# Patient Record
Sex: MALE | Race: WHITE | NOT HISPANIC OR LATINO | Employment: OTHER | URBAN - METROPOLITAN AREA
[De-identification: names, ages, dates, MRNs, and addresses within clinical notes are randomized per-mention and may not be internally consistent; named-entity substitution may affect disease eponyms.]

---

## 2017-08-03 ENCOUNTER — OFFICE VISIT (OUTPATIENT)
Dept: RETAIL CLINIC | Facility: CLINIC | Age: 32
End: 2017-08-03

## 2017-08-03 DIAGNOSIS — Z02.83 ENCOUNTER FOR DRUG SCREENING: Primary | ICD-10-CM

## 2020-06-26 ENCOUNTER — TELEMEDICINE (OUTPATIENT)
Dept: FAMILY MEDICINE CLINIC | Facility: CLINIC | Age: 35
End: 2020-06-26

## 2020-06-26 ENCOUNTER — TELEPHONE (OUTPATIENT)
Dept: FAMILY MEDICINE CLINIC | Facility: CLINIC | Age: 35
End: 2020-06-26

## 2020-06-26 DIAGNOSIS — R19.7 DIARRHEA, UNSPECIFIED TYPE: ICD-10-CM

## 2020-06-26 DIAGNOSIS — M79.10 MYALGIA: ICD-10-CM

## 2020-06-26 DIAGNOSIS — R07.89 INTERMITTENT LEFT-SIDED CHEST PAIN: ICD-10-CM

## 2020-06-26 DIAGNOSIS — R42 DIZZINESS: Primary | ICD-10-CM

## 2020-06-26 PROCEDURE — 99203 OFFICE O/P NEW LOW 30 MIN: CPT | Performed by: INTERNAL MEDICINE

## 2020-06-26 RX ORDER — MECLIZINE HYDROCHLORIDE 25 MG/1
25 TABLET ORAL 3 TIMES DAILY PRN
Qty: 30 TABLET | Refills: 1 | Status: SHIPPED | OUTPATIENT
Start: 2020-06-26 | End: 2020-06-26 | Stop reason: SDUPTHER

## 2020-06-26 RX ORDER — MECLIZINE HYDROCHLORIDE 25 MG/1
25 TABLET ORAL 3 TIMES DAILY PRN
Qty: 30 TABLET | Refills: 1 | Status: SHIPPED | OUTPATIENT
Start: 2020-06-26 | End: 2021-06-15

## 2020-06-26 NOTE — TELEPHONE ENCOUNTER
PATIENT CALLED STATING HAD A VV WITH DR VALLE THIS MORNING, AND WAS SUPPOSED TO SEND A PRESCRIPTION TO THE PHARMACY TO HELP WITH DIZZINESS.   PLEASE ADVISE    [phone]

## 2020-06-26 NOTE — PROGRESS NOTES
Subjective   Deyanira Mishel is a 35 y.o. male.  Patient former patient of ours but switch to insurance we do not accept.  3 days ago developed sudden onset of dizziness associated with diarrhea myalgias has not taken his temperature.  Never had ability to do so  There is no height or weight on file to calculate BMI.  History of Present Illness   You have chosen to receive care through a telehealth visit.  Do you consent to use a video/audio connection for your medical care today? Yes  Dizzy 3d ago 3 days ago more of a lightheaded sensation.  Seems to come and go with some records not aware of any particular head motions provoking this said diarrhea began about the same time no nausea vomiting no bladder reach goes through him no blood or mucus no the stools having charley horse/muscle cramps recurrently was not working and overly hot environment.  Is still urinating keeping fluids down patient is having some cough some occasional intermittent sharp short lasted sharp pain left side of chest.  Gets very transient overall does not feel good he has been to another facility has had a nasal swab done for COVID-19 I was 2 days ago he was told would have the results in 2-5 business days.  I will have him let us know his results he also said the swab did not go hyalinosis uncomfortable like it hurt others described so is not sure if that was deep enough to produce an accurate test has developed a drug allergy or antibiotic allergies since he is last here he does not know the name of it he was seen at [facility] for that we will have to try retrieve that information from him.  Overall healthy no chronic illnesses reported..  No underlying health disorders patient has no contributory family history does have an unknown antibiotic allergic reaction causing hives he was seen at [facility] for that wife try to retrieve that information no existing medicines patient's no reported smoking.  Review of Systems    HENT:        Dizziness see present illness   Respiratory:        Occasional l short-lived left-sided chest pain intermittent   Gastrointestinal: Positive for diarrhea.   Musculoskeletal: Positive for myalgias.   All other systems reviewed and are negative.      Objective   There were no vitals filed for this visit.      Physical Exam   Constitutional: He is oriented to person, place, and time. He appears well-developed and well-nourished.   Has not had ability check his temperature but does not feel hot.   HENT:   Head: Normocephalic and atraumatic.   Eyes: Conjunctivae are normal.   Pupils are equal and round.   Pulmonary/Chest: Effort normal.   No audible wheezing noted able to converse without trouble breathing.   Neurological: He is alert and oriented to person, place, and time.   Skin: Skin is warm and dry.       No results found for: INR    Procedures    Assessment/Plan   Total time of video visit 35 minutes  1.  Dizziness Antivert 25 1 every 8 hours as needed.    2.  Diarrhea try over-the-counter Imodium see if that helps    3 intermittent left-sided chest pain observation for now if worsens go to urgent care or go to ER.    4.  Myalgias patient's had a swab done for [facility] testing elsewhere.  This occurred 2 days ago he was told he did have results back in 2-5 business days.  We are going to self quarantine patient for 14 days time he also indicates the swab did not go very far and his nose was questioning how valid or accurate the test will be we will have him subsequently for 10 days time if symptoms progress we might have him go to the urgent care and I did mention one at [address] for testing.  If the diarrhea is sufficiently bad that he quits urinating or is having fairly intense chest pain or shortness of breath he is to go to the ER instead for further testing there with ability to get a quick turnaround COVID test done.    Work note self quarantine home 14 days time

## 2020-06-26 NOTE — PATIENT INSTRUCTIONS

## 2020-06-30 ENCOUNTER — TELEMEDICINE (OUTPATIENT)
Dept: FAMILY MEDICINE CLINIC | Facility: CLINIC | Age: 35
End: 2020-06-30

## 2020-06-30 DIAGNOSIS — F32.A DEPRESSION, UNSPECIFIED DEPRESSION TYPE: Primary | ICD-10-CM

## 2020-06-30 DIAGNOSIS — R19.7 DIARRHEA, UNSPECIFIED TYPE: ICD-10-CM

## 2020-06-30 DIAGNOSIS — R42 DIZZINESS: ICD-10-CM

## 2020-06-30 PROCEDURE — 99213 OFFICE O/P EST LOW 20 MIN: CPT | Performed by: INTERNAL MEDICINE

## 2020-06-30 RX ORDER — ESCITALOPRAM OXALATE 10 MG/1
TABLET ORAL
Qty: 30 TABLET | Refills: 0 | Status: SHIPPED | OUTPATIENT
Start: 2020-06-30 | End: 2021-06-15

## 2020-06-30 NOTE — PROGRESS NOTES
Subjective   Deyanira Florentino is a 35 y.o. male.  Patient getting back with me on recent illness with dizziness myalgias diarrhea did get tested for COVID-19 his test was negative I do not locate that but this is what he told me today  There is no height or weight on file to calculate BMI.  History of Present Illness You have chosen to receive care through a telehealth visit.  Do you consent to use a video/audio connection for your medical care today? Yes  Recent illness with dizziness fatigue myalgias some diarrhea did get tested for COVID-19 was tested negative he is under quarantine 14 days which will and July 10.  Still has dizziness intermittent still somewhat tired also intermittently diarrhea is been there if he eats too much she has diarrhea if he eats lightly he does not there is no blood or mucus in stool no reported temperature with him he does have an elevated pressure has been doing this for several weeks now no thoughts of hurting self hurting others but is feeling down and assertive self-contained type individual is not sure if others notices or not.  Several things going on including death of father is problematic to patient again denies thoughts of hurting self or hurting others.  But we will put him on a antidepressant he has not been on any before with plan follow-up in 1 month's time with the dizziness Antivert does help but is not resolved totally we will wait for the quarantine.  2 and on the 10th and then send him to ENT for evaluation  .  If diarrhea persist beyond 10 also bring in for stool specimens.  This was a video visit..  Out successfully with both audio and visual on both ends.  Review of Systems    Objective   There were no vitals filed for this visit.      Physical Exam   Constitutional: He appears well-developed and well-nourished.   HENT:   Head: Normocephalic and atraumatic.   Eyes: Conjunctivae are normal.   Pupils are equal   Pulmonary/Chest: Effort normal.   No visible or  audible respiratory distress able to speak in complete sentences   Skin:   No rash apparent skin appears within normal limits to visual       No results found for: INR    Procedures    Assessment/Plan Video visit duration 20 minutes    1.  Dizziness plan refer to ENT continue Antivert    2.  Depression mild plan Lexapro 10 mg half tablet daily for 1 week then whole tab daily covering her status in 1 month's time    3.  Diarrhea observation for now no voiced blood or mucus in the stool if still having diarrhea after July 10 which is the patient's end of his quarantine phase he is coming for stool cultures.    4.  Myalgias much improved nearly  Resolved plan observation     By his history did get seen and tested for COVID-19 he states his test was negative.    Much of this encounter note is an electronic transcription/translation of spoken language to printed text.  The electronic translation of spoken language may permit erroneous, or at times, nonsensical words or phrases to be inadvertently transcribed.  Although I have reviewed the note for such errors, some may still exist. If there are questions or for further clarification, please contact me.

## 2020-07-07 ENCOUNTER — DOCUMENTATION (OUTPATIENT)
Dept: FAMILY MEDICINE CLINIC | Facility: CLINIC | Age: 35
End: 2020-07-07

## 2022-01-18 ENCOUNTER — TRANSCRIBE ORDERS (OUTPATIENT)
Dept: OCCUPATIONAL THERAPY | Facility: CLINIC | Age: 37
End: 2022-01-18

## 2022-01-18 DIAGNOSIS — R29.898 LEFT HAND WEAKNESS: Primary | ICD-10-CM

## 2022-01-18 DIAGNOSIS — R20.2 LEFT HAND PARESTHESIA: ICD-10-CM

## 2022-01-20 ENCOUNTER — TREATMENT (OUTPATIENT)
Dept: PHYSICAL THERAPY | Facility: CLINIC | Age: 37
End: 2022-01-20

## 2022-01-20 DIAGNOSIS — R20.2 LEFT HAND PARESTHESIA: ICD-10-CM

## 2022-01-20 DIAGNOSIS — R29.898 LEFT HAND WEAKNESS: Primary | ICD-10-CM

## 2022-01-20 PROCEDURE — 97110 THERAPEUTIC EXERCISES: CPT | Performed by: PHYSICAL THERAPIST

## 2022-01-20 PROCEDURE — 97162 PT EVAL MOD COMPLEX 30 MIN: CPT | Performed by: PHYSICAL THERAPIST

## 2022-01-20 PROCEDURE — 97530 THERAPEUTIC ACTIVITIES: CPT | Performed by: PHYSICAL THERAPIST

## 2022-01-20 NOTE — PROGRESS NOTES
Physical Therapy Initial Evaluation and Plan of Care    Patient Name: Deyanira Florentino          :  1985  Referring Physician: Dalia Acosta APRN  Diagnosis: Left hand weakness [R29.898]    Date of Evaluation: 2022  ______________________________________________________________________    Subjective Evaluation    History of Present Illness  Date of onset: 2022 (Paper says 2022; Pt says 2022)  Mechanism of injury: Lacerationi LIF -Cutting a quarter round - using a mechanical lathe / scissors-type to cut wood -    Noting numbness in fingers 4-5 w/ shooting / electrical pain from wrist into hand and fincers 4/5 (L) -Fingers 4-5 numb -   Pt says his hand feels like if is :dislocated off his arm / detached -           Patient Occupation: Maintainence - appartments -  Pain  Current pain ratin  At worst pain rating: 10  Location: Ulnar hand, 4-5th finger along ulnar forearm to medial elbow (L)   Alleviating factors: Rest -   Exacerbated by: Useing hane/ arm - Lifting / carrying items of wt, etc.  Progression: worsening    Hand dominance: right    Diagnostic Tests  X-ray: normal    Treatments  Current treatment: immobilization and medication  Patient Goals  Patient/family treatment goals: Pain alleviated; normal sensation, mobiilty and strength to allow ADLs and normal job -          ___________________________________________________  Objective          Observations     Additional Wrist/Hand Observation Details  (L) hand held in guarded posture -   Healing laceration tip of LIF - slight swelling IF (L)  No atrophy / bruising noted -     Tenderness     Additional Tenderness Details  Decreased sensation to LT Fingers 4-5 and ulnar hand (L) -     Active Range of Motion     Additional Active Range of Motion Details  AROM Fingers: WF/NL             Wrist WFL    Strength/Myotome Testing     Additional Strength Details   (R); 110; (L) 60#  Finger / intrinsic strength grossly intact, but  "difficult to fully assess due to Pt apprehension during movement / testing - (L) hand -     Tests     Additional Tests Details  (+) Tinels at Cubital Tunnel;  (-) At [place] canal;   Pt noted \"crackling\" in ulnar 2 fingers (L) w/ elbow flexion test (L) which increased the longer the position was held -   (-) tinels at Carpal tunnel - (L)  (-) Wartenberg sign (L) hand        See Treatment Flow sheet for Exercises, Manual therapy, and modalities. ( Pt could not tolerate ESTIM or MHP -     FUNCTIONAL ACTIVITIES:   · TAPING / BRACING: NA  · Education on relaxation of hand and why important -   · Discussed desensitization techniques, etc   · Jt protection, ADL modification; Posture and     ___________________________________________________  Assessment & Plan     Assessment    Assessment details: LIF Laceration - Lt hand paresthesias -  Apparent UN involvement?   Has been referred to Hand specialty -   Pt very anxious - constantly holds his (L) hand in guarded posture (constant cuing required to have Pt relax his hand) and Pt frequently grunting/ gasping w/ any movement / even at rest -   Concerned Pt may develop a form of RSD is he does not learn to relax, etc.     PROBLEMS: Limited mobility  Weakness; Pain, numbness; intolerance to ADLs and normal job duties w/ LUE -   PROGNOSIS: Fair / Good    GOALS:   SHORT TERM GOALS: 2 weeks:  1) HEP Initiated; 2) Pain decreased 50%:   3)  (L) hand increased 10#   4) Improved relaxed positioining (L) hand / fingers w/ cuing - ;     LONG TERM GOALS: 4 weeks (or at time of DISCHARGE): 1) (I) HEP; 2) AROM WFL and pain free; 3) Strength / mobility to be able to perform all ADL's and job-related activities w/o restrictions; 4)Pt able to demonstrate relaxed posturing of (L) hand -     Plan  Planned therapy interventions: flexibility, home exercise program, joint mobilization, manual therapy, neuromuscular re-education, strengthening, therapeutic activities and stretching " (Modalities prn; Taping / bracing prn)  Other planned therapy interventions: Sensory desensitization, etc  Frequency: 3x week  Duration in weeks: 4  Treatment plan discussed with: patient  Plan details: Patient awaiting referral to Hand MD -   Pt could not tolerate Estim or MHP -       ___________________________________________________  Manual Therapy:         mins  91806;  Therapeutic Exercise:    20     mins  96474;     Neuromuscular Sravan:        mins  93551;    Therapeutic Activity:     15     mins  71942;   Self Care:                           mins  39018  Ultrasound:          mins  48767;  Iontophoresis:          mins  21468;    Electrical Stimulation:         mins  86465 ( );  Mechanical Traction:          mins  53227  Dry Needling          mins self-pay    Eval:   20   mins    Timed Treatment:   35   mins                  Total Treatment:     60   mins    PT SIGNATURE:   Juan Antonio Rajan, PT  DATE TREATMENT INITIATED: 1/20/2022  ___________________________________________________  Initial Certification  Certification Period: 4/20/2022  I certify that the therapy services are furnished while this patient is under my care.  The services outlined above are required by this patient, and will be reviewed every 90 days.     PHYSICIAN: ________________________________  DATE: ______  Dalia Acosta APRN        Please sign and return via fax to [phone].. Thank you, [facility].  ______________________________________________________________________  [address] [place] [address]  Phone: [phone] Fax: [phone]

## 2022-01-24 ENCOUNTER — TREATMENT (OUTPATIENT)
Dept: PHYSICAL THERAPY | Facility: CLINIC | Age: 37
End: 2022-01-24

## 2022-01-24 DIAGNOSIS — R29.898 LEFT HAND WEAKNESS: Primary | ICD-10-CM

## 2022-01-24 DIAGNOSIS — R20.2 LEFT HAND PARESTHESIA: ICD-10-CM

## 2022-01-24 PROCEDURE — 97110 THERAPEUTIC EXERCISES: CPT | Performed by: PHYSICAL THERAPIST

## 2022-01-24 NOTE — PROGRESS NOTES
Physical Therapy Daily Progress Note    Visit Diagnoses:    ICD-10-CM ICD-9-CM   1. Left hand weakness  R29.898 728.87   2. Left hand paresthesia  R20.2 782.0       VISIT#: 2      Deyanira Florentino reports: His hand it doing better. He can hold things for a little while now but he loses his strength and then drops the item. The tip of his second digit is still numb. His exercises are helping him and he can move his hand better but he does feel some tension like a rubber band. He has no pain no just discomfort. The discomfort comes and goes. He can turn door knobs better now. Tingling and numbness are better but still there.   Current Pain Level:    4-5/10; Worst:   8-9/10; Best:  4-5/10  Location Of Pain: Ulnar hand, 4-5th finger along ulnar forearm to medial elbow (L)   Quality of Pain: numbness in fingers 4-5 w/ shooting / electrical pain from wrist into hand and fincers 4/5 (L) -Fingers 4-5 numb   Response to Previous Session: Good. No issues  Functional Deficits/Irritating Factors: Using hand/ arm - Lifting / carrying items of wt, etc.  Progression: improving  Compliance with HEP Reported: Yes    Objective  Presents: IF wound is closed now. No guarding today  Healing laceration tip of 2nd digit - slight swelling 2nd digit (L)  Increased sets/reps of:  Tendon Glides, Wrist AROM,  ball squeeze,   Increased resistance on:  none  Added to Program: none        See Exercise, Manual, and Modality Logs for complete treatment.     Patient Education: Pt was educated on exercise biomechanical correctness, intensity, and speed.     Assessment:  Pt has improved since last session. He is having less intense pain and his mobility has increased. He was not guarded today. He does continue to have tingling and numbness and loss of strength in his hand and 2,4 and 5 digits.  Pt will continue to benefit from skilled PT interventions to address current functional deficits and impairments.       Plan: Progress to/Continue with  current program. Progress strengthening and AROM. Prayer hand stretch into extension for finger and wrist extension?        Manual Therapy:    0     mins  42726;  Ultrasound:   0 mins 89020  Electrical Stimulation: __0____ mins 78966/  Iontophoresis: 0 mins 53162  Traction: 0 mins  56636  Work Conditionin mins 35556  Therapeutic Exercise:    15     mins  79140;     Neuromuscular Sravan:    0    mins  00399;    Therapeutic Activity:     0     mins  51768;     Timed Treatment:   15   mins   Total Treatment:     30   mins    Karla Altman, [facility] License # [id number]  Physical Therapist Assistant

## 2022-01-25 ENCOUNTER — TREATMENT (OUTPATIENT)
Dept: PHYSICAL THERAPY | Facility: CLINIC | Age: 37
End: 2022-01-25

## 2022-01-25 DIAGNOSIS — R29.898 LEFT HAND WEAKNESS: Primary | ICD-10-CM

## 2022-01-25 DIAGNOSIS — R20.2 LEFT HAND PARESTHESIA: ICD-10-CM

## 2022-01-25 PROCEDURE — 97110 THERAPEUTIC EXERCISES: CPT | Performed by: PHYSICAL THERAPIST

## 2022-01-25 NOTE — PROGRESS NOTES
Physical Therapy Daily Progress Note    Patient Name: Deyanira Florentino         :  1985  Referring Physician: Dalia Acosta APRN      Subjective   Deyanira Florentino reports: improving with decreasing pain, improved mobility and improving strength and endurance, but still gets weak / sore quickly with use - more able to move, , twist, etc.   Numb at tip of IF and fingers 4-5 -   Pt to see Hand specialists in near future -     Objective   Pt demonstrating guarded posturing of (L) hand / fingers, but relaxes w/ cuing -   Pt demonstrated improved AROM and strength w/ activities in PT -     See Exercise, Manual, and Modality Logs for complete treatment.     Functional / Therapeutic Activities:    · TAPING / BRACING: NA  · Jt protection, ADL modification; Posture and      Assessment/Plan LIF Laceration - Lt hand paresthesias -  Apparent UN involvement?   Has been referred to Hand specialty -   Pt very anxious - constantly holds his (L) hand in guarded posture (frequent cuing required to have Pt relax his hand)   Pt would benefit from hand referral -     Improving with decreasing pain and improving mobility, strength, function, but still limited by pain and numbness mostly along UN dermatome -      Pt to see Hand MD -        _________________________________________________    Manual Therapy:                 mins  58952;  Therapeutic Exercise:    25    mins  13421;     Neuromuscular Sravan:        mins  22735;    Therapeutic Activity:           mins  61275;     Ultrasound:                          mins  63526;  Iontophoresis:                     mins  75991;    Electrical Stimulation:        mins  07521 ( );  Mechanical Traction:          mins  48178  MHP                  10     mins NC     Timed Treatment:   25    mins                  Total Treatment:     40    mins    Juan Antonio Rajan, PT  Physical Therapist

## 2023-01-12 ENCOUNTER — OFFICE VISIT (OUTPATIENT)
Dept: FAMILY MEDICINE CLINIC | Facility: CLINIC | Age: 38
End: 2023-01-12
Payer: MEDICAID

## 2023-01-12 ENCOUNTER — PATIENT MESSAGE (OUTPATIENT)
Dept: FAMILY MEDICINE CLINIC | Facility: CLINIC | Age: 38
End: 2023-01-12
Payer: MEDICAID

## 2023-01-12 VITALS
HEART RATE: 80 BPM | HEIGHT: 74 IN | TEMPERATURE: 98.4 F | SYSTOLIC BLOOD PRESSURE: 122 MMHG | DIASTOLIC BLOOD PRESSURE: 80 MMHG | WEIGHT: 275 LBS | OXYGEN SATURATION: 98 % | BODY MASS INDEX: 35.29 KG/M2

## 2023-01-12 DIAGNOSIS — R00.2 PALPITATIONS: ICD-10-CM

## 2023-01-12 DIAGNOSIS — Z13.1 SCREENING FOR DIABETES MELLITUS: ICD-10-CM

## 2023-01-12 DIAGNOSIS — R19.8 UMBILICAL BLEEDING: ICD-10-CM

## 2023-01-12 DIAGNOSIS — R42 DIZZINESS: ICD-10-CM

## 2023-01-12 DIAGNOSIS — R94.31 ABNORMAL EKG: ICD-10-CM

## 2023-01-12 DIAGNOSIS — R11.2 NAUSEA AND VOMITING, UNSPECIFIED VOMITING TYPE: ICD-10-CM

## 2023-01-12 DIAGNOSIS — Z11.59 NEED FOR HEPATITIS C SCREENING TEST: ICD-10-CM

## 2023-01-12 DIAGNOSIS — R19.7 INTERMITTENT DIARRHEA: ICD-10-CM

## 2023-01-12 DIAGNOSIS — R06.02 SHORTNESS OF BREATH: ICD-10-CM

## 2023-01-12 DIAGNOSIS — Z76.89 ENCOUNTER TO ESTABLISH CARE: Primary | ICD-10-CM

## 2023-01-12 LAB
BASOPHILS # BLD AUTO: 0.06 10*3/MM3 (ref 0–0.2)
BASOPHILS NFR BLD AUTO: 0.5 % (ref 0–1.5)
DEPRECATED RDW RBC AUTO: 38.8 FL (ref 37–54)
EOSINOPHIL # BLD AUTO: 0.37 10*3/MM3 (ref 0–0.4)
EOSINOPHIL NFR BLD AUTO: 3 % (ref 0.3–6.2)
ERYTHROCYTE [DISTWIDTH] IN BLOOD BY AUTOMATED COUNT: 12.4 % (ref 12.3–15.4)
FOLATE SERPL-MCNC: 11.8 NG/ML (ref 4.78–24.2)
HBA1C MFR BLD: 5.6 % (ref 4.8–5.6)
HCT VFR BLD AUTO: 42.4 % (ref 37.5–51)
HCV AB SER DONR QL: NORMAL
HGB BLD-MCNC: 14.6 G/DL (ref 13–17.7)
IMM GRANULOCYTES # BLD AUTO: 0.05 10*3/MM3 (ref 0–0.05)
IMM GRANULOCYTES NFR BLD AUTO: 0.4 % (ref 0–0.5)
LYMPHOCYTES # BLD AUTO: 4.22 10*3/MM3 (ref 0.7–3.1)
LYMPHOCYTES NFR BLD AUTO: 34.6 % (ref 19.6–45.3)
MCH RBC QN AUTO: 29.6 PG (ref 26.6–33)
MCHC RBC AUTO-ENTMCNC: 34.4 G/DL (ref 31.5–35.7)
MCV RBC AUTO: 85.8 FL (ref 79–97)
MONOCYTES # BLD AUTO: 0.76 10*3/MM3 (ref 0.1–0.9)
MONOCYTES NFR BLD AUTO: 6.2 % (ref 5–12)
NEUTROPHILS NFR BLD AUTO: 55.3 % (ref 42.7–76)
NEUTROPHILS NFR BLD AUTO: 6.73 10*3/MM3 (ref 1.7–7)
NRBC BLD AUTO-RTO: 0 /100 WBC (ref 0–0.2)
PLATELET # BLD AUTO: 333 10*3/MM3 (ref 140–450)
PMV BLD AUTO: 10 FL (ref 6–12)
RBC # BLD AUTO: 4.94 10*6/MM3 (ref 4.14–5.8)
VIT B12 BLD-MCNC: 574 PG/ML (ref 211–946)
WBC NRBC COR # BLD: 12.19 10*3/MM3 (ref 3.4–10.8)

## 2023-01-12 PROCEDURE — 80050 GENERAL HEALTH PANEL: CPT | Performed by: NURSE PRACTITIONER

## 2023-01-12 PROCEDURE — 82746 ASSAY OF FOLIC ACID SERUM: CPT | Performed by: NURSE PRACTITIONER

## 2023-01-12 PROCEDURE — 99204 OFFICE O/P NEW MOD 45 MIN: CPT | Performed by: NURSE PRACTITIONER

## 2023-01-12 PROCEDURE — 84466 ASSAY OF TRANSFERRIN: CPT | Performed by: NURSE PRACTITIONER

## 2023-01-12 PROCEDURE — 86803 HEPATITIS C AB TEST: CPT | Performed by: NURSE PRACTITIONER

## 2023-01-12 PROCEDURE — 84439 ASSAY OF FREE THYROXINE: CPT | Performed by: NURSE PRACTITIONER

## 2023-01-12 PROCEDURE — 93000 ELECTROCARDIOGRAM COMPLETE: CPT | Performed by: NURSE PRACTITIONER

## 2023-01-12 PROCEDURE — 83036 HEMOGLOBIN GLYCOSYLATED A1C: CPT | Performed by: NURSE PRACTITIONER

## 2023-01-12 PROCEDURE — 82728 ASSAY OF FERRITIN: CPT | Performed by: NURSE PRACTITIONER

## 2023-01-12 PROCEDURE — 83540 ASSAY OF IRON: CPT | Performed by: NURSE PRACTITIONER

## 2023-01-12 PROCEDURE — 82607 VITAMIN B-12: CPT | Performed by: NURSE PRACTITIONER

## 2023-01-12 NOTE — PROGRESS NOTES
Chief Complaint  Dizziness (Occasionally dizzy ) and Bleeding/Bruising (Occasional discharge and blood from belly button area. )    Subjective            Deyanira Florentino presents to [facility]  History of Present Illness     Encounter to establish care - he has not had medical care for several years; since HS.     He is here today with his wife - Monserrat. He and his wife have been  since 2021. Since that time he has gained 25 pounds - he states he eats what she cooks, and prior to that he ate a lot of ramen or other things; he didn't cook routinely.     He has been having some bloody discharge and pus coming from the belly button intermittently. It will saturate his clothing intermittently without warning. He denies any abdominal pain. States it does not hurt to pee or poop. He moves his bowels regularly. In the past two days he has had diarrhea 'with the belly button thing'. He states that the diarrhea comes and goes and he will go 2-3 times per day. He denies any blood in his stool. He has nausea more than vomiting, but he has probably vomited x2 in the past two months.     He has intermittent dizziness - his wife states that she notices it most when he goes a long time without eating. They usually eat breakfast by 7-7:30 and if they do not have lunch by 1PM then he will be in a cold sweat, but feel hot, and dizzy. He feels like he is going to pass out. He will eat and start to feel better. He feels better if he eats anything at that point. He has never passed out before.     He denies any chest pain but feels like he has an 'irregular heartbeat' from time to time. He sometimes has to take a deeper breath with it. He gets headaches sometimes but not often. He gets motion sickness with driving. He denies any LE edema. He drinks 2-3 red bulls daily and a 12 pack of Mt. Dew QOD.     PHQ-2 Total Score: 0    Past Medical History:   Diagnosis Date   • Depression        Allergies  "  Allergen Reactions   • Penicillins Other (See Comments)        Past Surgical History:   Procedure Laterality Date   • CIRCUMCISION          Social History     Tobacco Use   • Smoking status: Every Day     Packs/day: 0.50     Types: Cigarettes   • Smokeless tobacco: Never   Vaping Use   • Vaping Use: Every day   • Substances: Nicotine, Flavoring   Substance Use Topics   • Alcohol use: Yes     Comment: social   • Drug use: Never       Family History   Problem Relation Age of Onset   • Heart attack Father         Health Maintenance Due   Topic Date Due   • COVID-19 Vaccine (1) Never done   • Pneumococcal Vaccine 0-64 (1 - PCV) Never done   • HEPATITIS C SCREENING  Never done   • ANNUAL PHYSICAL  Never done   • INFLUENZA VACCINE  Never done        Current Outpatient Medications on File Prior to Visit   Medication Sig   • diclofenac (VOLTAREN) 50 MG EC tablet Take 1 tablet by mouth 3 (Three) Times a Day.     No current facility-administered medications on file prior to visit.       Immunization History   Administered Date(s) Administered   • Tdap 11/12/2018, 01/04/2022       Review of Systems     Objective     /80   Pulse 80   Temp 98.4 °F (36.9 °C)   Ht 186.7 cm (73.5\")   Wt 125 kg (275 lb)   SpO2 98%   BMI 35.79 kg/m²       There were no vitals filed for this visit.      Physical Exam  Vitals reviewed.   Constitutional:       General: He is not in acute distress.     Appearance: He is well-developed. He is obese.   HENT:      Head: Normocephalic and atraumatic.      Right Ear: Tympanic membrane, ear canal and external ear normal. There is no impacted cerumen.      Left Ear: Tympanic membrane, ear canal and external ear normal. There is no impacted cerumen.      Nose: Nose normal.      Mouth/Throat:      Mouth: Mucous membranes are moist.      Pharynx: Oropharynx is clear. No oropharyngeal exudate or posterior oropharyngeal erythema.   Eyes:      General: No scleral icterus.        Right eye: No discharge. "         Left eye: No discharge.      Extraocular Movements: Extraocular movements intact.      Conjunctiva/sclera: Conjunctivae normal.      Pupils: Pupils are equal, round, and reactive to light.   Neck:      Thyroid: No thyroid mass, thyromegaly or thyroid tenderness.      Vascular: No carotid bruit.      Trachea: Trachea normal.   Cardiovascular:      Rate and Rhythm: Normal rate and regular rhythm.      Pulses: Normal pulses.      Heart sounds: No murmur heard.  Pulmonary:      Effort: Pulmonary effort is normal. No respiratory distress.      Breath sounds: Normal breath sounds. No wheezing, rhonchi or rales.   Abdominal:      General: Bowel sounds are normal. There is no distension.      Palpations: Abdomen is soft. There is no mass.      Tenderness: There is no abdominal tenderness. There is no guarding or rebound.      Hernia: No hernia is present.      Comments: No obvious wound or other deformity of the umbilicus. There is no active drainage or exudates or bleeding from the umbilicus on exam. He states he did shower PTA, but had drainage prior to his shower.    Musculoskeletal:         General: Normal range of motion.      Cervical back: Normal range of motion and neck supple.      Right lower leg: No edema.      Left lower leg: No edema.   Lymphadenopathy:      Cervical: No cervical adenopathy.   Skin:     General: Skin is warm and dry.   Neurological:      Mental Status: He is alert and oriented to person, place, and time.   Psychiatric:         Mood and Affect: Mood and affect normal.         Behavior: Behavior normal.         Thought Content: Thought content normal.         Judgment: Judgment normal.         Result Review :     The following data was reviewed by: GAURI Harry on 01/12/2023:    CBC and Differential (01/22/2015 10:10)  Basic metabolic panel (01/22/2015 10:14)        Data reviewed: Radiologic studies : CXR   XR Chest PA & Lateral (In Office) (01/12/2023 13:32)      ECG 12  Lead    Date/Time: 1/12/2023 1:38 PM  Performed by: Alina Monroe APRN  Authorized by: Alina Monroe APRN   Comparison: not compared with previous ECG   Previous ECG: no previous ECG available  Rhythm: sinus rhythm  Rate: normal  BPM: 66  Conduction comments: IVCD, consider RBBB  Other findings: non-specific ST-T wave changes    Clinical impression: abnormal EKG              Assessment and Plan      Diagnoses and all orders for this visit:    1. Encounter to establish care (Primary)    2. Dizziness  -     ECG 12 Lead  -     CBC Auto Differential  -     Comprehensive Metabolic Panel  -     TSH+Free T4  -     Iron Profile  -     Ferritin  -     Vitamin B12 & Folate  -     Holter monitor - 48 hour; Future  -     Adult Transthoracic Echo Complete W/ Cont if Necessary Per Protocol; Future  -     Ambulatory Referral to Cardiology    3. Palpitations  -     ECG 12 Lead  -     CBC Auto Differential  -     Comprehensive Metabolic Panel  -     TSH+Free T4  -     Iron Profile  -     Ferritin  -     Vitamin B12 & Folate  -     Holter monitor - 48 hour; Future  -     Adult Transthoracic Echo Complete W/ Cont if Necessary Per Protocol; Future  -     Ambulatory Referral to Cardiology    4. Shortness of breath  -     XR Chest PA & Lateral (In Office)  -     Adult Transthoracic Echo Complete W/ Cont if Necessary Per Protocol; Future    5. Umbilical bleeding  -     CT Abdomen Pelvis With Contrast; Future    6. Intermittent diarrhea  -     CT Abdomen Pelvis With Contrast; Future    7. Nausea and vomiting, unspecified vomiting type  -     CT Abdomen Pelvis With Contrast; Future    8. Screening for diabetes mellitus  -     Hemoglobin A1c    9. Need for hepatitis C screening test  -     Hepatitis C Antibody    10. Abnormal EKG  -     Holter monitor - 48 hour; Future  -     Adult Transthoracic Echo Complete W/ Cont if Necessary Per Protocol; Future  -     Ambulatory Referral to Cardiology            Follow Up     Follow up  pending outcome of labs -     CXR showed no acute findings. Orthostatic BP readings are WNL. EKG is SR, but does suggest IVCD/BBB. Since he is symptomatic, and does have a family history of MI/heart disease in his father causing premature death, I am going to refer to cardio for workup. I will also go ahead and order 2D echo and Holter monitor for 48 hours. Encouraged weight loss. He needs to significantly reduce caffeine consumption.     I will get CT AP to further assess umbilical concerns, nausea with vomiting, and diarrhea.     Patient was given instructions and counseling regarding his condition or for health maintenance advice. Please see specific information pulled into the AVS if appropriate.

## 2023-01-12 NOTE — PROGRESS NOTES
Venipuncture Blood Specimen Collection  Venipuncture performed in left arm by Esther Soriano with good hemostasis. Patient tolerated the procedure well without complications.   01/12/23   Esther Soriano

## 2023-01-13 ENCOUNTER — PATIENT MESSAGE (OUTPATIENT)
Dept: FAMILY MEDICINE CLINIC | Facility: CLINIC | Age: 38
End: 2023-01-13
Payer: MEDICAID

## 2023-01-13 LAB
ALBUMIN SERPL-MCNC: 4.7 G/DL (ref 3.5–5.2)
ALBUMIN/GLOB SERPL: 1.7 G/DL
ALP SERPL-CCNC: 101 U/L (ref 39–117)
ALT SERPL W P-5'-P-CCNC: 46 U/L (ref 1–41)
ANION GAP SERPL CALCULATED.3IONS-SCNC: 9.6 MMOL/L (ref 5–15)
AST SERPL-CCNC: 26 U/L (ref 1–40)
BILIRUB SERPL-MCNC: 0.3 MG/DL (ref 0–1.2)
BUN SERPL-MCNC: 13 MG/DL (ref 6–20)
BUN/CREAT SERPL: 13.8 (ref 7–25)
CALCIUM SPEC-SCNC: 9.8 MG/DL (ref 8.6–10.5)
CHLORIDE SERPL-SCNC: 98 MMOL/L (ref 98–107)
CO2 SERPL-SCNC: 31.4 MMOL/L (ref 22–29)
CREAT SERPL-MCNC: 0.94 MG/DL (ref 0.76–1.27)
EGFRCR SERPLBLD CKD-EPI 2021: 108.4 ML/MIN/1.73
FERRITIN SERPL-MCNC: 211 NG/ML (ref 30–400)
GLOBULIN UR ELPH-MCNC: 2.8 GM/DL
GLUCOSE SERPL-MCNC: 76 MG/DL (ref 65–99)
IRON 24H UR-MRATE: 83 MCG/DL (ref 59–158)
IRON SATN MFR SERPL: 23 % (ref 20–50)
POTASSIUM SERPL-SCNC: 4.4 MMOL/L (ref 3.5–5.2)
PROT SERPL-MCNC: 7.5 G/DL (ref 6–8.5)
SODIUM SERPL-SCNC: 139 MMOL/L (ref 136–145)
T4 FREE SERPL-MCNC: 1.32 NG/DL (ref 0.93–1.7)
TIBC SERPL-MCNC: 361 MCG/DL (ref 298–536)
TRANSFERRIN SERPL-MCNC: 242 MG/DL (ref 200–360)
TSH SERPL DL<=0.05 MIU/L-ACNC: 1.05 UIU/ML (ref 0.27–4.2)

## 2023-01-13 NOTE — TELEPHONE ENCOUNTER
From: Tigist Proctor MA  To: Deyanira Florentino  Sent: 1/12/2023 2:50 PM EST  Subject: chest xray    Alina Mcmillan wanted to let you know that your chest xray was read as normal. We will let you know what your lab work shows once we get it back.    Thanks,  Tigist

## 2023-01-13 NOTE — TELEPHONE ENCOUNTER
From: Deyanira Florentino  To: Alina Monroe  Sent: 1/13/2023 10:56 AM EST  Subject: Question regarding CBC W/ AUTO DIFFERENTIAL    That could have been when I had a cyst on my shoulder and also had a allergic reaction to penicillin. I had a infection in my shoulder. This time my belly button is acting up. I had been ok but comes and goes. Blood, discharge and a bad smell.

## 2023-01-17 ENCOUNTER — HOSPITAL ENCOUNTER (OUTPATIENT)
Dept: CT IMAGING | Facility: HOSPITAL | Age: 38
Discharge: HOME | End: 2023-01-17
Admitting: NURSE PRACTITIONER
Payer: MEDICAID

## 2023-01-17 DIAGNOSIS — R19.8 UMBILICAL BLEEDING: ICD-10-CM

## 2023-01-17 DIAGNOSIS — R11.2 NAUSEA AND VOMITING, UNSPECIFIED VOMITING TYPE: ICD-10-CM

## 2023-01-17 DIAGNOSIS — R19.7 INTERMITTENT DIARRHEA: ICD-10-CM

## 2023-01-17 PROCEDURE — 0 IOPAMIDOL PER 1 ML: Performed by: NURSE PRACTITIONER

## 2023-01-17 PROCEDURE — 74177 CT ABD & PELVIS W/CONTRAST: CPT

## 2023-01-17 RX ADMIN — IOPAMIDOL 100 ML: 755 INJECTION, SOLUTION INTRAVENOUS at 17:39

## 2023-01-18 DIAGNOSIS — N28.9 LESION OF RIGHT NATIVE KIDNEY: Primary | ICD-10-CM

## 2023-02-13 ENCOUNTER — HOSPITAL ENCOUNTER (OUTPATIENT)
Dept: CARDIOLOGY | Facility: HOSPITAL | Age: 38
Discharge: HOME | End: 2023-02-13
Admitting: NURSE PRACTITIONER
Payer: MEDICAID

## 2023-02-13 DIAGNOSIS — R42 DIZZINESS: ICD-10-CM

## 2023-02-13 DIAGNOSIS — R00.2 PALPITATIONS: ICD-10-CM

## 2023-02-13 DIAGNOSIS — R94.31 ABNORMAL EKG: ICD-10-CM

## 2023-02-13 PROCEDURE — 93225 XTRNL ECG REC<48 HRS REC: CPT

## 2023-02-17 ENCOUNTER — TELEPHONE (OUTPATIENT)
Dept: FAMILY MEDICINE CLINIC | Facility: CLINIC | Age: 38
End: 2023-02-17

## 2023-02-17 NOTE — TELEPHONE ENCOUNTER
Provider: AGUEDA MENDOZA    Caller: JARETT    Relationship to Patient: SPOUSE    Phone Number: [phone]    Reason for Call: THE PATIENT'S WIFE WANTED TO LET PCP KNOW THERE WAS A RED FLAG ON 02/14/23 AT 11:38 AM FOR THE HEART MONITOR. SHE WANTED PCP TO KNOW THIS WAS THE WORST EPISODE HE'S HAD SO FAR BUT WHEN THE RESULTS ARE BACK THIS IS WHAT CAUSED IT ON 02/14/23

## 2023-02-22 ENCOUNTER — PATIENT MESSAGE (OUTPATIENT)
Dept: FAMILY MEDICINE CLINIC | Facility: CLINIC | Age: 38
End: 2023-02-22
Payer: MEDICAID

## 2023-02-22 NOTE — TELEPHONE ENCOUNTER
From: Deyanira Florentino  To: Alina Monroe  Sent: 2/22/2023 7:42 AM EST  Subject: Heart monitor question    Good morning, Just curious if they sent you over the heart monitor reading yet. I dropped it off last Thursday. The 16th. Thank you, I wasn’t feeling good when I first put it on. About 30 minutes before hand and after getting it on. Then I had a really bad episode while in the car as my wife was driving and I did press the button. I’m experiencing a lot of dizziness when in motion and not in control unless I eat something. Didn’t know if that was heart related too or not.

## 2023-02-28 ENCOUNTER — HOSPITAL ENCOUNTER (OUTPATIENT)
Dept: CARDIOLOGY | Facility: HOSPITAL | Age: 38
Discharge: HOME | End: 2023-02-28
Payer: MEDICAID

## 2023-02-28 ENCOUNTER — PATIENT MESSAGE (OUTPATIENT)
Dept: FAMILY MEDICINE CLINIC | Facility: CLINIC | Age: 38
End: 2023-02-28
Payer: MEDICAID

## 2023-02-28 ENCOUNTER — HOSPITAL ENCOUNTER (OUTPATIENT)
Dept: ULTRASOUND IMAGING | Facility: HOSPITAL | Age: 38
Discharge: HOME | End: 2023-02-28
Payer: MEDICAID

## 2023-02-28 ENCOUNTER — TELEPHONE (OUTPATIENT)
Dept: FAMILY MEDICINE CLINIC | Facility: CLINIC | Age: 38
End: 2023-02-28
Payer: MEDICAID

## 2023-02-28 DIAGNOSIS — R00.2 PALPITATIONS: ICD-10-CM

## 2023-02-28 DIAGNOSIS — R06.02 SHORTNESS OF BREATH: ICD-10-CM

## 2023-02-28 DIAGNOSIS — R42 DIZZINESS: ICD-10-CM

## 2023-02-28 DIAGNOSIS — R94.31 ABNORMAL EKG: ICD-10-CM

## 2023-02-28 DIAGNOSIS — N28.9 LESION OF RIGHT NATIVE KIDNEY: ICD-10-CM

## 2023-02-28 LAB
BH CV ECHO MEAS - AO ROOT DIAM: 3.3 CM
BH CV ECHO MEAS - EDV(MOD-SP2): 92 ML
BH CV ECHO MEAS - EDV(MOD-SP4): 93 ML
BH CV ECHO MEAS - EF(MOD-BP): 61 %
BH CV ECHO MEAS - EF(MOD-SP4): 60 %
BH CV ECHO MEAS - ESV(MOD-SP2): 34 ML
BH CV ECHO MEAS - ESV(MOD-SP4): 37 ML
BH CV ECHO MEAS - IVSD: 1 CM
BH CV ECHO MEAS - LA DIMENSION: 3.7 CM
BH CV ECHO MEAS - LAT PEAK E' VEL: 19.6 CM/SEC
BH CV ECHO MEAS - LVIDD: 5.2 CM
BH CV ECHO MEAS - LVIDS: 3.5 CM
BH CV ECHO MEAS - LVOT DIAM: 2.4 CM
BH CV ECHO MEAS - LVPWD: 1 CM
BH CV ECHO MEAS - MED PEAK E' VEL: 14 CM/SEC
BH CV ECHO MEAS - MV A MAX VEL: 71 CM/SEC
BH CV ECHO MEAS - MV DEC TIME: 85 MSEC
BH CV ECHO MEAS - MV E MAX VEL: 84 CM/SEC
BH CV ECHO MEAS - MV E/A: 1.2
BH CV ECHO MEAS - RVDD: 3.4 CM
BH CV ECHO MEASUREMENTS AVERAGE E/E' RATIO: 5
LEFT ATRIUM VOLUME INDEX: 14.5 ML/M2
LEFT ATRIUM VOLUME: 37.3 ML
MAXIMAL PREDICTED HEART RATE: 185 BPM
MAXIMAL PREDICTED HEART RATE: 185 BPM
STRESS TARGET HR: 157 BPM
STRESS TARGET HR: 157 BPM

## 2023-02-28 PROCEDURE — 93306 TTE W/DOPPLER COMPLETE: CPT

## 2023-02-28 PROCEDURE — 93227 XTRNL ECG REC<48 HR R&I: CPT | Performed by: INTERNAL MEDICINE

## 2023-02-28 PROCEDURE — 76775 US EXAM ABDO BACK WALL LIM: CPT

## 2023-02-28 NOTE — TELEPHONE ENCOUNTER
From: Deyanira Florentino  To: Alina Monroe  Sent: 2/28/2023 2:22 PM EST  Subject: Question regarding US RENAL BILATERAL    What do they mean by grossly unremarkable?

## 2023-02-28 NOTE — TELEPHONE ENCOUNTER
Pt called for holter monitor results.  It didn't look like we had them back yet but I found them in media.

## 2023-03-15 PROBLEM — R00.2 PALPITATIONS: Status: ACTIVE | Noted: 2023-03-15

## 2023-03-15 NOTE — PROGRESS NOTES
"Chief Complaint  Establish Care, Palpitations, Abnormal ECG, and Dizziness      History of Present Illness  Deyanira Florentino presents to [facility]  Patient is a 37-year-old with a longstanding history of heart palpitations as well as some tachycardic issues going on since 09 recently more frequent.  He reports episodes of tachycardia just at rest also symptoms with heart palpitations.  When his heart feels like it is just racing.  He has also had episodes while driving or sometimes after eating dizziness nausea and diaphoresis feeling like he may pass out.  He has not had any overt syncopal spell with this.  He did have a monitor with 1 of these episodes of presyncope at that time with recording of an event he was not in any dysrhythmia.    Past Medical History:   Diagnosis Date   • Depression        No current outpatient medications on file.    Medications Discontinued During This Encounter   Medication Reason   • diclofenac (VOLTAREN) 50 MG EC tablet *Therapy completed     Allergies   Allergen Reactions   • Penicillins Other (See Comments)        Social History     Tobacco Use   • Smoking status: Every Day     Packs/day: 0.50     Types: Cigarettes   • Smokeless tobacco: Never   Vaping Use   • Vaping Use: Every day   • Substances: Nicotine, Flavoring   Substance Use Topics   • Alcohol use: Yes     Comment: social   • Drug use: Never       Family History   Problem Relation Age of Onset   • Heart attack Father    Dad  of enlarged heart per patient    Objective     /87   Pulse 76   Ht 186.7 cm (73.5\")   Wt 125 kg (275 lb)   BMI 35.79 kg/m²       Physical Exam    General Appearance:   · no acute distress  · Alert and oriented x3  HENT:   · lips not cyanotic  · Atraumatic  Neck:  · No jvd   · supple  Respiratory:  · no respiratory distress  · normal breath sounds  · no rales  Cardiovascular:  · Regular rate and rhythm  · no S3, no S4   · no murmur  · no rub  Extremities  · No " cyanosis  · lower extremity edema: none    Skin:   · warm, dry  · No rashes    Result Review :     No results found for: PROBNP  CMP    CMP 1/12/23   Glucose 76   BUN 13   Creatinine 0.94   eGFR 108.4   Sodium 139   Potassium 4.4   Chloride 98   Calcium 9.8   Total Protein 7.5   Albumin 4.7   Globulin 2.8   Total Bilirubin 0.3   Alkaline Phosphatase 101   AST (SGOT) 26   ALT (SGPT) 46 (A)   Albumin/Globulin Ratio 1.7   BUN/Creatinine Ratio 13.8   Anion Gap 9.6   (A) Abnormal value       Comments are available for some flowsheets but are not being displayed.           CBC w/diff    CBC w/Diff 1/12/23   WBC 12.19 (A)   RBC 4.94   Hemoglobin 14.6   Hematocrit 42.4   MCV 85.8   MCH 29.6   MCHC 34.4   RDW 12.4   Platelets 333   Neutrophil Rel % 55.3   Immature Granulocyte Rel % 0.4   Lymphocyte Rel % 34.6   Monocyte Rel % 6.2   Eosinophil Rel % 3.0   Basophil Rel % 0.5   (A) Abnormal value             Lab Results   Component Value Date    TSH 1.050 01/12/2023      Lab Results   Component Value Date    FREET4 1.32 01/12/2023      No results found for: DDIMERQUANT  No results found for: MG   No results found for: DIGOXIN   No results found for: TROPONINT   No results found for: POCTROP(         holter 1/23  Sinus rhythm with an average heart rate of 83 bpm and frequent sinus bradycardia (16.8% of the total heartbeats) and sinus tachycardia (24.1% of the total heartbeats) with minimum and maximum heart rates of 45 and 149 bpm, respectively.  There was no evidence of arrhythmias.  Rare isolated PVCs were observed, but no PACs were noted during the study.  There was no evidence of pauses or AV block.  The patient's lone reported symptomatic episode (skipped beat) correlated with normal sinus rhythm, with no abnormalities observed.         Results for orders placed during the hospital encounter of 02/28/23    Adult Transthoracic Echo Complete W/ Cont if Necessary Per Protocol    Interpretation Summary  •  Left ventricular  systolic function is normal. Calculated left ventricular EF = 61%  •  Left ventricular diastolic function was normal.         No results found for this or any previous visit.             The ASCVD Risk score (Mela DK, et al., 2019) failed to calculate for the following reasons:    The 2019 ASCVD risk score is only valid for ages 40 to 79     Diagnoses and all orders for this visit:    1. Near syncope (Primary)  Assessment & Plan:  Patient with intermittent near syncopal episodes associated dizziness and nausea echocardiogram though is within normal limits his Holter monitor does not show any dysrhythmias and he was wearing this with 1 events.  Based on this no obvious cardiac issue from a rate standpoint the possibility of autonomic dysfunction is certainly in the differential recommended a tilt table test for further evaluation of this.  Overall reassured patient that there does not appear to be any increased cardiovascular risk based on his work-up for any adverse events.  Did recommend fluid hydration and compression socks for the possibility of this being autonomically related    Orders:  -     Tilt Table; Future    2. Palpitations  Assessment & Plan:  Patient with symptoms of PVCs which were seen infrequently on his Holter monitor discussed with him the benign nature would not recommend any pharmacologic treatment for this.  Patient can try over-the-counter magnesium supplement            Follow Up     No follow-ups on file.          Patient was given instructions and counseling regarding his condition or for health maintenance advice. Please see specific information pulled into the AVS if appropriate.

## 2023-03-16 ENCOUNTER — OFFICE VISIT (OUTPATIENT)
Dept: CARDIOLOGY | Facility: CLINIC | Age: 38
End: 2023-03-16
Payer: MEDICAID

## 2023-03-16 VITALS
DIASTOLIC BLOOD PRESSURE: 87 MMHG | WEIGHT: 275 LBS | SYSTOLIC BLOOD PRESSURE: 145 MMHG | HEART RATE: 76 BPM | HEIGHT: 74 IN | BODY MASS INDEX: 35.29 KG/M2

## 2023-03-16 DIAGNOSIS — R00.2 PALPITATIONS: ICD-10-CM

## 2023-03-16 DIAGNOSIS — R55 NEAR SYNCOPE: Primary | ICD-10-CM

## 2023-03-16 PROBLEM — R42 DIZZINESS: Status: ACTIVE | Noted: 2023-03-16

## 2023-03-16 PROCEDURE — 99204 OFFICE O/P NEW MOD 45 MIN: CPT | Performed by: INTERNAL MEDICINE

## 2023-03-16 NOTE — ASSESSMENT & PLAN NOTE
Patient with symptoms of PVCs which were seen infrequently on his Holter monitor discussed with him the benign nature would not recommend any pharmacologic treatment for this.  Patient can try over-the-counter magnesium supplement

## 2023-03-16 NOTE — ASSESSMENT & PLAN NOTE
Patient with intermittent near syncopal episodes associated dizziness and nausea echocardiogram though is within normal limits his Holter monitor does not show any dysrhythmias and he was wearing this with 1 events.  Based on this no obvious cardiac issue from a rate standpoint the possibility of autonomic dysfunction is certainly in the differential recommended a tilt table test for further evaluation of this.  Overall reassured patient that there does not appear to be any increased cardiovascular risk based on his work-up for any adverse events.  Did recommend fluid hydration and compression socks for the possibility of this being autonomically related

## 2023-03-20 ENCOUNTER — OFFICE VISIT (OUTPATIENT)
Dept: FAMILY MEDICINE CLINIC | Facility: CLINIC | Age: 38
End: 2023-03-20
Payer: MEDICAID

## 2023-03-20 VITALS
TEMPERATURE: 97 F | DIASTOLIC BLOOD PRESSURE: 80 MMHG | SYSTOLIC BLOOD PRESSURE: 120 MMHG | BODY MASS INDEX: 35.53 KG/M2 | WEIGHT: 273 LBS | HEART RATE: 86 BPM | OXYGEN SATURATION: 97 %

## 2023-03-20 DIAGNOSIS — R42 DIZZINESS: Primary | ICD-10-CM

## 2023-03-20 DIAGNOSIS — R11.2 NAUSEA AND VOMITING, UNSPECIFIED VOMITING TYPE: ICD-10-CM

## 2023-03-20 DIAGNOSIS — R00.2 PALPITATIONS: ICD-10-CM

## 2023-03-20 PROCEDURE — 1159F MED LIST DOCD IN RCRD: CPT | Performed by: NURSE PRACTITIONER

## 2023-03-20 PROCEDURE — 99214 OFFICE O/P EST MOD 30 MIN: CPT | Performed by: NURSE PRACTITIONER

## 2023-03-20 PROCEDURE — 1160F RVW MEDS BY RX/DR IN RCRD: CPT | Performed by: NURSE PRACTITIONER

## 2023-03-20 NOTE — PROGRESS NOTES
"Chief Complaint  Follow-up (Go over test results )    Subjective         Deyanira Florentino presents to [facility]  History of Present Illness     Deyanira presents to the office today for follow up on test results and to discuss plan of care moving forward.     Thus far he has had an echocardiogram and 48 hour holter monitor - both of which showed no significant abnormalities. He saw Dr. Shields last Thursday to go over the tests, and due to his ongoing symptoms Dr. Shields is arranging for a tilt table study.     He states that he is still having dizziness and nausea. It can occur at rest and can occur when just riding or driving in the car. He has noticed in recent weeks that if he eats more frequently then the symptoms occur less often.     Labs thus far have been unrevealing - normal Hemoglobin A1c. WBC mildly elevated on CBC, and mild elevation in LFTS, but otherwise nothing on labs to suggest underlying cause for symptoms. He has not yet tried checking his blood sugar when he is symptomatic to see if he is perhaps hypoglycemic.     Objective   Vital Signs:   /80   Pulse 86   Temp 97 °F (36.1 °C)   Wt 124 kg (273 lb)   SpO2 97%   BMI 35.53 kg/m²     Estimated body mass index is 35.53 kg/m² as calculated from the following:    Height as of 3/16/23: 186.7 cm (73.5\").    Weight as of this encounter: 124 kg (273 lb).     Physical Exam   Constitutional: He appears well-developed and well-nourished. No distress.   HENT:   Head: Normocephalic and atraumatic.   Eyes: Conjunctivae and EOM are normal. No scleral icterus.   Pulmonary/Chest: Effort normal.   Musculoskeletal: Normal range of motion.   Skin: He is not diaphoretic.   Psychiatric: He has a normal mood and affect.          Result Review :     The following data was reviewed by: GAURI Harry on 03/20/2023:    CMP    CMP 1/12/23   Glucose 76   BUN 13   Creatinine 0.94   eGFR 108.4   Sodium 139   Potassium 4.4 "   Chloride 98   Calcium 9.8   Total Protein 7.5   Albumin 4.7   Globulin 2.8   Total Bilirubin 0.3   Alkaline Phosphatase 101   AST (SGOT) 26   ALT (SGPT) 46 (A)   Albumin/Globulin Ratio 1.7   BUN/Creatinine Ratio 13.8   Anion Gap 9.6   (A) Abnormal value       Comments are available for some flowsheets but are not being displayed.           CBC    CBC 1/12/23   WBC 12.19 (A)   RBC 4.94   Hemoglobin 14.6   Hematocrit 42.4   MCV 85.8   MCH 29.6   MCHC 34.4   RDW 12.4   Platelets 333   (A) Abnormal value                TSH    TSH 1/12/23   TSH 1.050           A1C Last 3 Results    HGBA1C Last 3 Results 1/12/23   Hemoglobin A1C 5.60           Iron Profile (01/12/2023 13:39)  Ferritin (01/12/2023 13:39)  Vitamin B12 & Folate (01/12/2023 13:39)  Hepatitis C Antibody (01/12/2023 13:39)    Data reviewed: Cardiology studies Echo/Holter and Consultant notes : Dr. Shields's note   ECG 12 Lead (01/12/2023 13:38)  Holter monitor - 48 hour (02/13/2023 13:12)  Adult Transthoracic Echo Complete W/ Cont if Necessary Per Protocol (02/28/2023 14:48)    Office Visit with Brad Shields MD (03/16/2023)           Assessment and Plan       Diagnoses and all orders for this visit:    1. Dizziness (Primary)    2. Palpitations    3. Nausea and vomiting, unspecified vomiting type                Follow Up      No follow-ups on file.     He is going to proceed with tilt table test per cardiology - pending outcome - if negative, then we will proceed with MRI brain and referral to neurology and/or ENT to further assess his dizziness. I did suggest that he get an inexpensive glucometer OTC to check his blood sugar when symptoms occur, as he could have hypoglycemia as well. Encouraged patient to wear compression stockings and drink at least 1 gallon of water per day until autonomic dysfunction has been evaluated by tilt table test. Voiced understanding.     Patient was given instructions and counseling regarding his condition or for health maintenance  advice. Please see specific information pulled into the AVS if appropriate.     Mode of Visit: Video  Location of patient: other: Initially went to office, then visit continued via his car  Location of provider: home  You have chosen to receive care through a telehealth visit.  Does the patient consent to use a video/audio connection for your medical care today? Yes  The visit included audio and video interaction. No technical issues occurred during this visit.

## 2023-06-14 ENCOUNTER — HOSPITAL ENCOUNTER (OUTPATIENT)
Dept: CARDIOLOGY | Facility: HOSPITAL | Age: 38
Discharge: HOME | End: 2023-06-14
Admitting: INTERNAL MEDICINE
Payer: MEDICAID

## 2023-06-14 VITALS — WEIGHT: 270 LBS | BODY MASS INDEX: 34.65 KG/M2 | HEIGHT: 74 IN

## 2023-06-14 DIAGNOSIS — R55 NEAR SYNCOPE: ICD-10-CM

## 2023-06-14 PROCEDURE — 93660 TILT TABLE EVALUATION: CPT | Performed by: INTERNAL MEDICINE

## 2023-06-14 PROCEDURE — 93660 TILT TABLE EVALUATION: CPT

## 2023-06-14 NOTE — DISCHARGE INSTRUCTIONS
Cardiovascular Services Phone Number: [phone]    Normal activities may be resumed after you are released from the hospital.  Normal consumption of foods and liquids may be resumed immediately after the study.  Contact your physician who directed your tilt table study if you experience any symptoms again.      In the event of an emergency, call 911 or go to your nearest emergency room.

## 2023-06-19 ENCOUNTER — TELEPHONE (OUTPATIENT)
Dept: CARDIOLOGY | Facility: CLINIC | Age: 38
End: 2023-06-19
Payer: MEDICAID

## 2023-06-19 RX ORDER — FLUDROCORTISONE ACETATE 0.1 MG/1
0.1 TABLET ORAL DAILY
Qty: 90 TABLET | Refills: 3 | Status: SHIPPED | OUTPATIENT
Start: 2023-06-19 | End: 2023-06-19

## 2023-06-19 RX ORDER — MIDODRINE HYDROCHLORIDE 2.5 MG/1
2.5 TABLET ORAL 2 TIMES DAILY
Qty: 60 TABLET | Refills: 0 | Status: SHIPPED | OUTPATIENT
Start: 2023-06-19

## 2023-06-19 NOTE — TELEPHONE ENCOUNTER
----- Message from GAURI Wiggins sent at 6/19/2023  8:57 AM EDT -----  Notify pt Tilt table test was positive for neurocardiogenic syncope. Start fludrocortisone 0.1 mg daily. Advise adequate fluid hydration and compression socks. Have patient follow up in 3 months.

## 2023-06-19 NOTE — TELEPHONE ENCOUNTER
SW patient and wife regarding results and recommendations. Voiced understanding. All questions answered. F/U made

## 2023-06-19 NOTE — TELEPHONE ENCOUNTER
Received call from patient wife. Patient took the fludrocortisone and hour later started not feeling well. /105. SW Gaby. Per Gaby stop fludrocortisone. Start midodrine 2.5 mg BID and monitor BP/HR. Wife and patient made aware.

## 2024-07-03 ENCOUNTER — OFFICE VISIT (OUTPATIENT)
Dept: FAMILY MEDICINE CLINIC | Facility: CLINIC | Age: 39
End: 2024-07-03
Payer: MEDICAID

## 2024-07-03 VITALS
WEIGHT: 281 LBS | HEART RATE: 95 BPM | DIASTOLIC BLOOD PRESSURE: 92 MMHG | BODY MASS INDEX: 36.08 KG/M2 | SYSTOLIC BLOOD PRESSURE: 130 MMHG | OXYGEN SATURATION: 96 %

## 2024-07-03 DIAGNOSIS — L91.8 ACQUIRED SKIN TAG: Primary | ICD-10-CM

## 2024-07-03 DIAGNOSIS — L98.9 SKIN LESION OF LEFT LEG: ICD-10-CM

## 2024-07-03 DIAGNOSIS — Z98.890 STATUS POST ROUTINE CIRCUMCISION: ICD-10-CM

## 2024-07-03 DIAGNOSIS — R55 NEUROCARDIOGENIC SYNCOPE: ICD-10-CM

## 2024-07-03 DIAGNOSIS — L90.5 SCAR OF LOWER LEG: ICD-10-CM

## 2024-07-03 PROCEDURE — 1159F MED LIST DOCD IN RCRD: CPT | Performed by: NURSE PRACTITIONER

## 2024-07-03 PROCEDURE — 1160F RVW MEDS BY RX/DR IN RCRD: CPT | Performed by: NURSE PRACTITIONER

## 2024-07-03 PROCEDURE — 99214 OFFICE O/P EST MOD 30 MIN: CPT | Performed by: NURSE PRACTITIONER

## 2024-07-03 NOTE — PROGRESS NOTES
Chief Complaint  Establish Care (Skin tags under both arms/Spots on legs/And circumsition 2 yrs ago and having sensativity)    History of Present Illness  Deyanira Florentino is a 39 y.o. male who presents to [facility] with a past medical history of    Past Medical History:   Diagnosis Date    Depression      He has 3 skin tags - one on the RUE, axillary region, and two others on the LUE, axillary region - they are small, but catch on clothing and get irritated.     He has some spots on the legs that he is concerned with. One on the LLE and one on the RLE. The LLE spot has been there for a while - at least one year, but getting bigger, feels weird, and it is more raised now then when it first appeared. He took some [email or web address] images and 'tumor' comes up so he is worried. On the RLE he was cut with a piece of sheet metal - it sliced his leg and there is now an indentation in the leg. He was not treated for the injury, but did treat at home with cleaning and Neosporin.     He states he had a circumcision for the first time 1-2 years ago. He endorses 'sensitivity' after intercourse, but also with certain clothing touching the glans penis. He states upon withdrawal with intercourse it feels like someone has kicked him. When certain clothing touches him, it feels like sandpaper. The procedure was done by [facility].     He had a tilt table study done last year in June which showed neurocardiogenic syncope.  He had follow-up with cardiology and was initiated on Florinef.  After taking a dose he had hypertension, noting systolic blood pressure greater than 200.  He consulted with the pharmacist and the pharmacist told him that the cardiologist should have prescribed something to lower his blood pressure, not to increase it.  Uncertain if the pharmacist understood the reason for the Florinef being prescribed; however, patient states this made him question his confidence in his cardiologist.  He  did call the cardiologist office and they did advise him to stop taking Florinef and in its place prescribed midodrine.  He states he never did try taking midodrine because he was already too scared after experiencing the hypertension with Florinef.  He had 2 different follow-up appointments made for him with cardiology but did not go to either of them.    Objective   Vital Signs:   Vitals:    07/03/24 1345   BP: 130/92   Pulse: 95   SpO2: 96%   Weight: 127 kg (281 lb)     Body mass index is 36.08 kg/m².    Wt Readings from Last 3 Encounters:   07/03/24 127 kg (281 lb)   06/14/23 122 kg (270 lb)   03/20/23 124 kg (273 lb)     BP Readings from Last 3 Encounters:   07/03/24 130/92   03/20/23 120/80   03/16/23 145/87       Health Maintenance   Topic Date Due    Pneumococcal Vaccine 0-64 (1 of 2 - PCV) Never done    ANNUAL PHYSICAL  Never done    COVID-19 Vaccine (1 - 2023-24 season) Never done    INFLUENZA VACCINE  08/01/2024    TDAP/TD VACCINES (3 - Td or Tdap) 01/04/2032    HEPATITIS C SCREENING  Completed       Physical Exam  Vitals reviewed.   Constitutional:       General: He is not in acute distress.     Appearance: He is well-developed. He is obese.   HENT:      Head: Normocephalic and atraumatic.   Eyes:      General: No scleral icterus.        Right eye: No discharge.         Left eye: No discharge.      Extraocular Movements: Extraocular movements intact.      Conjunctiva/sclera: Conjunctivae normal.   Neck:      Trachea: Trachea normal.   Cardiovascular:      Rate and Rhythm: Normal rate and regular rhythm.      Pulses: Normal pulses.      Heart sounds: No murmur heard.  Pulmonary:      Effort: Pulmonary effort is normal.      Breath sounds: Normal breath sounds.   Musculoskeletal:         General: Normal range of motion.      Cervical back: Normal range of motion and neck supple. No tenderness.      Right lower leg: No edema.      Left lower leg: No edema.   Lymphadenopathy:      Cervical: No cervical  adenopathy.   Skin:     General: Skin is warm and dry.      Findings: Lesion present.          Neurological:      Mental Status: He is alert and oriented to person, place, and time.   Psychiatric:         Mood and Affect: Mood and affect normal.         Behavior: Behavior normal.         Thought Content: Thought content normal.         Judgment: Judgment normal.           Result Review :  The following data was reviewed by: GAURI Harry on 07/03/2024:    CBC Auto Differential (01/12/2023 13:39)  Comprehensive Metabolic Panel (01/12/2023 13:39)  TSH+Free T4 (01/12/2023 13:39)  Hemoglobin A1c (01/12/2023 13:39)  Iron Profile (01/12/2023 13:39)  Ferritin (01/12/2023 13:39)  Vitamin B12 & Folate (01/12/2023 13:39)  Hepatitis C Antibody (01/12/2023 13:39)    CT Abdomen Pelvis With Contrast (01/17/2023 17:39)  US Renal Bilateral (02/28/2023 12:47)    Tilt Table (06/14/2023 10:28)     Procedures        Assessment and Plan   Diagnoses and all orders for this visit:    1. Acquired skin tag (Primary)  -     Ambulatory Referral to Dermatology    2. Skin lesion of left leg  -     Ambulatory Referral to Dermatology    3. Scar of lower leg  Comments:  RLE  Orders:  -     Ambulatory Referral to Dermatology    4. Neurocardiogenic syncope  Comments:  Failed Florinef (significant elevated BP) - did not try midodrine out of fear of HTN  Orders:  -     Ambulatory Referral to Cardiology    5. Status post routine circumcision        Class 2 Severe Obesity (BMI >=35 and <=39.9). Obesity-related health conditions include the following: none. Obesity is worsening. BMI is is above average; BMI management plan is completed. We discussed Information on healthy weight added to patient's after visit summary.         FOLLOW UP  Return if symptoms worsen or fail to improve.    He has several skin concerns and wishes to see dermatology to further assess.  He has 3 skin tags which she would like removed, but also has concerns regarding  skin lesion of the left lower extremity.  In regards to the right lower extremity concerns, I suspect that the scar will be persistent given the location of injury.  Patient advised that this is a bony area without a lot of tissue and the cratered appearance is likely due to lack of tissue in this area.  He can confer with dermatology for their opinion.    In regards to his concerns for penile sensitivity status post circumcision 1 to 2 years ago, I did reach out to Dr. Johansen for her opinion.  [facility] performed at [facility].  Dr. Johansen suggested that sensitivity was likely due to new onset exposure of the glans after being uncircumcised most of his life.  She suggested silk underwear in place of cotton underwear.  Avoid tight fitting garments.  He can follow-up with urology if symptoms are persistent or more bothersome despite clothing changes.  She did recommend a good amount of lubrication for intercourse.    In regards to the neurocardiogenic syncope, he did not tolerate Florinef.  He did not try midodrine.  He continues to have symptoms.  I will refer him back to cardiology to discuss options for management.    Patient was given instructions and counseling regarding his condition or for health maintenance advice. Please see specific information pulled into the AVS if appropriate.       Alina Monroe, APRN  07/03/24  14:37 EDT    CURRENT & DISCONTINUED MEDICATIONS  No current outpatient medications    Medications Discontinued During This Encounter   Medication Reason    midodrine (PROAMATINE) 2.5 MG tablet *Therapy completed

## 2024-08-27 ENCOUNTER — OFFICE VISIT (OUTPATIENT)
Dept: CARDIOLOGY | Facility: CLINIC | Age: 39
End: 2024-08-27
Payer: MEDICAID

## 2024-08-27 VITALS
HEIGHT: 74 IN | HEART RATE: 82 BPM | BODY MASS INDEX: 36.45 KG/M2 | DIASTOLIC BLOOD PRESSURE: 86 MMHG | WEIGHT: 284 LBS | SYSTOLIC BLOOD PRESSURE: 144 MMHG

## 2024-08-27 DIAGNOSIS — R42 DIZZINESS: ICD-10-CM

## 2024-08-27 DIAGNOSIS — F17.200 SMOKER: ICD-10-CM

## 2024-08-27 DIAGNOSIS — F41.9 ANXIETY: ICD-10-CM

## 2024-08-27 DIAGNOSIS — R55 VASOVAGAL NEAR SYNCOPE: Primary | ICD-10-CM

## 2024-08-27 RX ORDER — NICOTINE 21 MG/24HR
1 PATCH, TRANSDERMAL 24 HOURS TRANSDERMAL EVERY 24 HOURS
Status: DISCONTINUED | OUTPATIENT
Start: 2024-08-27 | End: 2024-08-27

## 2024-08-27 RX ORDER — NICOTINE 21 MG/24HR
1 PATCH, TRANSDERMAL 24 HOURS TRANSDERMAL EVERY 24 HOURS
Status: DISCONTINUED | OUTPATIENT
Start: 2024-08-27 | End: 2024-08-28

## 2024-08-27 NOTE — PROGRESS NOTES
"  [facility]  INTERVENTIONAL CARDIOLOGY FOLLOW-UP PROGRESS NOTE        Chief Complaint  Excessive Sweating and Dizziness    Subjective            History of Present Illness  Deyanira Florentino is a 39 y.o. male who presents to [facility].  Patient has a past medical history of vasovagal syncope diagnosed with a tilt table test.  Patient was previously following Dr. Shields but wanted to switch provider.  Patient states that every now and then he gets sudden sweating and dizziness.  Last time he was in a car and started to have sweating and dizziness can of episode that lasted for couple of minutes.  He denied chest pain, palpitation, syncope or presyncope.  He denies leg edema.  He has previously been tried on fludrocortisone that increase his blood pressure.  Currently not on any medication.  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      Past History:  Past Medical History:   Diagnosis Date    Depression        Medical History:  Past Medical History:   Diagnosis Date    Depression        Surgical History:   has a past surgical history that includes Circumcision, non-.     Family History:   family history includes Heart attack (age of onset: 55) in his father.     Social History:   reports that he has been smoking cigarettes. He has never used smokeless tobacco. He reports current alcohol use. He reports that he does not use drugs.    Allergies:   Penicillins    No current outpatient medications on file prior to visit.     No current facility-administered medications on file prior to visit.          Review of Systems   Negative except as mentioned in HPI above.    Objective     /86   Pulse 82   Ht 188 cm (74.02\")   Wt 129 kg (284 lb)   BMI 36.45 kg/m²       Physical Exam    General : Alert, awake, no acute distress  Neck : Supple, no carotid bruit, no jugular venous " distention  CVS : Regular rate and rhythm, no murmur, rubs or gallops  Lungs: Clear to auscultation bilaterally, no crackles or rhonchi  Abdomen: Soft, nontender, bowel sounds heard in all 4 quadrants  Extremities: Warm, well-perfused, no pedal edema    Result Review :     The following data was reviewed by: Brennan Michaels MD on 08/27/2024:                   Data reviewed: Cardiology studies    Results for orders placed during the hospital encounter of 02/28/23    Adult Transthoracic Echo Complete W/ Cont if Necessary Per Protocol    Interpretation Summary    Left ventricular systolic function is normal. Calculated left ventricular EF = 61%    Left ventricular diastolic function was normal.        ECG 12 Lead    Date/Time: 8/27/2024 10:56 AM  Performed by: Brennan Michaels MD    Authorized by: Brennan Michaels MD  Comparison: compared with previous ECG   Similar to previous ECG  Rhythm: sinus rhythm  Rate: normal  QRS axis: normal    Clinical impression: normal ECG        No results found for this or any previous visit.        ASCVD Score  The ASCVD Risk score ([place] DK, et al., 2019) failed to calculate for the following reasons:    The 2019 ASCVD risk score is only valid for ages 40 to 79         Assessment and Plan          Diagnoses and all orders for this visit:    1. Vasovagal near syncope (Primary)    2. Dizziness    3. Anxiety          Plan  - Patient was discussed that his near syncopal episodes are likely secondary to anxiety related.  He had a positive tilt table test for vasovagal syncope.  Patient explained that any kind of high emotional states can trigger vasovagal response.  Patient advised to avoid excessive anxiety or high emotional states, and a referral to psychiatry was made for management of anxiety.  Also discussed with the patient that if he continues to have symptoms or worsening symptoms including syncope despite medications, behavioral therapy and other conservative measures, cardiac  pacing or cardio neuro ablation will be required, however we would like to avoid that at this point.  Patient is a current smoker advised to stop smoking.  Will prescribe over-the-counter medications.  Follow-up as needed.    Patient was educated on heart healthy diet, daily exercise and achieving optimal weight.     Follow Up     No follow-ups on file.      Deyanira Florentino  reports that he has been smoking cigarettes. He has never used smokeless tobacco. I have educated him on the risk of diseases from using tobacco products such as cancer, COPD, and heart disease.     I advised him to quit and he is willing to quit. We have discussed the following method/s for tobacco cessation:  Counseling and OTC Cessation Products.  Together we have set a quit date for 1 week from today.  He will follow up with me in several month or sooner to check on his progress.    I spent 5 minutes counseling the patient.               I spent 30 minutes caring for this patient on this date of service. This time includes time spent by me in the following activities:preparing for the visit, reviewing tests, obtaining and/or reviewing a separately obtained history, performing a medically appropriate examination and/or evaluation , counseling and educating the patient/family/caregiver, ordering medications, tests, or procedures, referring and communicating with other health care professionals , documenting information in the medical record, independently interpreting results and communicating that information with the patient/family/caregiver, and care coordination.     This is an acute or chronic illness that poses a threat to life or bodily function. The above treatment plan involves a high risk of complications and/or mortality of patient management.    The patient was seen and examined. Work by the provider also included review and/or ordering of lab tests, review and/or ordering of radiology tests, review and/or ordering of medicine  tests, discussion with other physicians or providers, independent review of data, obtaining old records, review/summation of old records, and/or other review.    I have reviewed the family history, social history, and past medical history for this patient. Previous information and data has been reviewed and updated as needed. I have reviewed and verified the chief complaint, history, and other documentation. The patient was interviewed and examined in the clinic and the chart reviewed. The previous observations, recommendations, and conclusions were reviewed including those of other providers.     The plan was discussed with the patient and/or family. The patient was given time to ask questions and these questions were answered. At the conclusion of their visit they had no additional questions or concerns and all questions were answered to their satisfaction.     Patient was given instructions and counseling regarding her condition or for health maintenance advice. Please see specific information pulled into the AVS if appropriate.      Brennan Michaels MD, FACC  08/27/24  10:20 EDT    Dictated Utilizing Dragon Dictation

## 2024-08-28 RX ORDER — NICOTINE 21 MG/24HR
1 PATCH, TRANSDERMAL 24 HOURS TRANSDERMAL EVERY 24 HOURS
Qty: 90 PATCH | Refills: 3 | Status: SHIPPED | OUTPATIENT
Start: 2024-08-28

## 2025-02-12 ENCOUNTER — TELEPHONE (OUTPATIENT)
Dept: CARDIOLOGY | Facility: CLINIC | Age: 40
End: 2025-02-12

## 2025-02-13 RX ORDER — NICOTINE 21 MG/24HR
1 PATCH, TRANSDERMAL 24 HOURS TRANSDERMAL EVERY 24 HOURS
Qty: 90 PATCH | Refills: 3 | Status: SHIPPED | OUTPATIENT
Start: 2025-02-13